# Patient Record
(demographics unavailable — no encounter records)

---

## 2025-06-03 NOTE — PLAN
[FreeTextEntry1] : Plan: Pt meets eligibility criteria for lung cancer screening.    -Low dose CT chest for lung cancer screening. Stella Peguero ordered the low dose CT.         -Follow up with  his referring provider after his LDCT results have been reviewed by the multidisciplinary clinical team, if needed.    -Encourage continued smoking abstinence.    Should I screen? tool utilized. 6 Year risk of lung cancer is   1.6%.    Patient wishes to proceed with screening.   Engaged in discussion regarding risks of screening during Covid-19 pandemic and precautions that are being used  to reduce exposure.   Engaged in shared decision making with Mr. COPPOLA . Answered all questions. He verbalized understanding and agreement. He knows to call back with and questions or concerns.

## 2025-06-03 NOTE — REASON FOR VISIT
[Other Location: e.g. School (Enter Location, City,State)___] : at [unfilled], at the time of the visit. [Other Location: e.g. Home (Enter Location, City,State)___] : at [unfilled] [Telephone (audio)] : This telephonic visit was provided via audio only technology. [Patient preference] : patient preference. [Verbal consent obtained from patient] : the patient, [unfilled] [Annual Follow-Up] : an annual follow-up visit [Review of Eligibility] : review of eligibility [Low-Dose CT Screening Discussion] : low-dose CT lung cancer screening discussion

## 2025-06-03 NOTE — HISTORY OF PRESENT ILLNESS
[Former] : Former [TextBox_13] : Referred by Stella Peguero   JARRETT COPPOLA had telephonic visit for a review of eligibility and discussion of the Low dose CT lung cancer screening program. A telephonic visit occurred due to the patient not having access, or declines to use, a smart phone or a computer for an audio/visual visit. The following was reviewed to determine if patient meets eligibility criteria. -Age 63 year Smoking Status: -Former smoker Started smoking at  15  years old. Smoked 1 PPD for 43 years -Number of pack years smokin  -Number of years since quitting smokin -Quit year:    Mr. COPPOLA denies any signs or symptoms of lung cancer including new cough, changing cough, hemoptysis, and unintentional weight loss.   Mr. COPPOLA denies HX of lung disease, heart disease, connective tissue disease, and any personal history of cancer. Reports no lung cancer in a 1st degree relative. Reports no lung cancer in a 2nd degree relative. Denies any history of occupational exposures.  [YearQuit] : 2020 [PacksperYear] : 43

## 2025-06-23 NOTE — ASSESSMENT
[FreeTextEntry1] : Data reviewed:  LDCT [facility] 6/2025 personally reviewed : mod emphysema and micronodules  Impression: Lung nodules Asymptomatic former smoker qualified for LDCT, quit 2020  Plan: Doing well. Repeat LDCT 6/2026 and see me then. PCV20 given.

## 2025-06-23 NOTE — HISTORY OF PRESENT ILLNESS
[Former] : former [TextBox_4] : 05/01/2024: Asked to evaluate patient by Dr Alex Farias for lung nodule. Former smoker since teens at 1ppd quit 2020, in LDCT. No dyspnea, no cough. Feels well, healthy. 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 Lives [facility]. Father emphysema.  6/23/2025: He's been well all year. Doing some PT for arthritis and bursitis. 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 Following w cardiology, everything is ok. Will spend summer in [place] w his partner, bought a place there. [YearQuit] : 2020

## 2025-06-23 NOTE — CONSULT LETTER
[Dear  ___] : Dear  [unfilled], [Courtesy Letter:] : I had the pleasure of seeing your patient, [unfilled], in my office today. [Please see my note below.] : Please see my note below. [Consult Closing:] : Thank you very much for allowing me to participate in the care of this patient.  If you have any questions, please do not hesitate to contact me. [Sincerely,] : Sincerely, [FreeTextEntry2] : Alex Farias, DO [address] [place] [address] [FreeTextEntry3] : [facility], [facility]

## 2025-06-23 NOTE — HISTORY OF PRESENT ILLNESS
[Former] : former [TextBox_4] : 05/01/2024: Asked to evaluate patient by Dr Alex Farias for lung nodule. Former smoker since teens at 1ppd quit 2020, in LDCT. No dyspnea, no cough. Feels well, healthy. 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 Lives [facility]. Father emphysema.  6/23/2025: He's been well all year. Doing some PT for arthritis and bursitis. 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 Following w cardiology, everything is ok. Will spend summer in [place] w his partner, bought a place there. [YearQuit] : 2020

## 2025-06-23 NOTE — CONSULT LETTER
[Dear  ___] : Dear  [unfilled], [Courtesy Letter:] : I had the pleasure of seeing your patient, [unfilled], in my office today. [Please see my note below.] : Please see my note below. [Consult Closing:] : Thank you very much for allowing me to participate in the care of this patient.  If you have any questions, please do not hesitate to contact me. [Sincerely,] : Sincerely, [FreeTextEntry2] : Alex Farias, DO [address] [place] [address] [FreeTextEntry3] : [facility] MD, [facility]